# Patient Record
Sex: MALE | Race: BLACK OR AFRICAN AMERICAN | NOT HISPANIC OR LATINO | Employment: UNEMPLOYED | URBAN - METROPOLITAN AREA
[De-identification: names, ages, dates, MRNs, and addresses within clinical notes are randomized per-mention and may not be internally consistent; named-entity substitution may affect disease eponyms.]

---

## 2018-10-01 ENCOUNTER — HOSPITAL ENCOUNTER (EMERGENCY)
Facility: HOSPITAL | Age: 28
Discharge: HOME | End: 2018-10-02
Attending: EMERGENCY MEDICINE | Admitting: EMERGENCY MEDICINE

## 2018-10-01 DIAGNOSIS — F31.9 BIPOLAR 1 DISORDER (HCC): Primary | ICD-10-CM

## 2018-10-01 PROCEDURE — 99284 EMERGENCY DEPT VISIT MOD MDM: CPT

## 2018-10-01 RX ORDER — RISPERIDONE 1 MG/1
5 TABLET, FILM COATED ORAL ONCE
Status: COMPLETED | OUTPATIENT
Start: 2018-10-01 | End: 2018-10-02

## 2018-10-01 RX ORDER — GABAPENTIN 300 MG/1
300 CAPSULE ORAL ONCE
Status: COMPLETED | OUTPATIENT
Start: 2018-10-01 | End: 2018-10-02

## 2018-10-02 VITALS
HEART RATE: 74 BPM | SYSTOLIC BLOOD PRESSURE: 138 MMHG | DIASTOLIC BLOOD PRESSURE: 86 MMHG | OXYGEN SATURATION: 100 % | WEIGHT: 195 LBS | TEMPERATURE: 98 F | HEIGHT: 71 IN | RESPIRATION RATE: 18 BRPM | BODY MASS INDEX: 27.3 KG/M2

## 2018-10-02 LAB
AMPHETAMINES SERPL QL SCN: NEGATIVE
BARBITURATES UR QL: NEGATIVE
BENZODIAZ UR QL: NEGATIVE
COCAINE UR QL: NEGATIVE
METHADONE UR QL: NEGATIVE
OPIATES UR QL SCN: NEGATIVE
PCP UR QL: NEGATIVE
THC UR QL: NEGATIVE

## 2018-10-02 PROCEDURE — 80307 DRUG TEST PRSMV CHEM ANLYZR: CPT | Performed by: EMERGENCY MEDICINE

## 2018-10-02 RX ORDER — IBUPROFEN 600 MG/1
600 TABLET ORAL ONCE
Status: COMPLETED | OUTPATIENT
Start: 2018-10-02 | End: 2018-10-02

## 2018-10-02 RX ADMIN — GABAPENTIN 300 MG: 300 CAPSULE ORAL at 00:18

## 2018-10-02 RX ADMIN — IBUPROFEN 600 MG: 600 TABLET ORAL at 00:54

## 2018-10-02 RX ADMIN — RISPERIDONE 5 MG: 1 TABLET, FILM COATED ORAL at 00:18

## 2018-10-02 NOTE — ED NOTES
Pt was given clothes to go home for discharge  Pt's family does not believe pt should go home  However, CW and doctor agree it is okay  Pt is calm and cooperative  No s/s of distress noted        Chaparro Love  10/02/18 2662

## 2018-10-02 NOTE — ED NOTES
Pt presents to the ED with his brother stating that he ran out of his psych meds 2 weeks ago and wanted to get new scripts tonight in the ED  Pt notes that he hasn't seen his psychiatrist in [place] for the past 2 months, and he would get refills at [facility] in [place]  Pt does note that he sees a therapist in [place] via [employer]  Pt reports poor sleep and appetite  He states that he is Dx'ed with Bipolar D/O and got into an argument with family members tonight so he thought he would come to the hospital and get his meds refilled  CW explained that the ED physicians cannot follow his progress in the community and therefore do not write long-term maintenance scripts  Pt denies any suicidal or homicidal ideations, nor any auditory or visual hallucinations at this time  Pt denies any D & A problems nor any legal issues  CW discussed [facility] residence with pt, noting he would be seen by a psychiatrist there and would be able to get scripts for his meds  However, pt declined going to [place]  At this time pt is requesting to go home  CW discussed this case with Dr Jess Delgado who is in agreement with D/C'ing pt home

## 2018-10-02 NOTE — ED NOTES
Patient calm and cooperative when given discharge instructions  Everardo Side Everardo Side Brother expressed feelings of being upset over brother not being placed by crisis today  Offered contact phone numbers for outpatient behavioral health treatment   brother declined        Naomie Vasquez RN  10/02/18 0895

## 2018-10-02 NOTE — DISCHARGE INSTRUCTIONS
Follow up with your psychiatrist for further management of your medications  If you feel like hurting yourself or others, call [phone]

## 2018-10-02 NOTE — ED NOTES
Pt requested a blanket and is asking for ibuprofen due to a headache  Pt was given the blanket and the doctor was notified about the medications       Kajal Medrano  10/02/18 9328

## 2018-10-02 NOTE — ED NOTES
Pt was laying on bed  Pt is calm and cooperative  No s/s of distress noted       Pablo Naranjo  10/02/18 9947

## 2018-10-02 NOTE — ED NOTES
Pt given scrubs  Pt changed and was cooperative  Pt TASHA was   000  Pt asked for TV remote  Pt was provided with remote  Pt family member was in room with pt  Pt spoke to Dr Ashley Diggs  Pt is calm  No s/s of distress noted        Lou Castaneda  10/01/18 0657

## 2018-10-02 NOTE — ED PROVIDER NOTES
History  Chief Complaint   Patient presents with    Psychiatric Evaluation     H/O bipolar without medications for 3 months  Pt feeling unstable     72-year-old male presents with desire for medication refill  He states that he is bipolar and has not had his medications for about 3 months  He states he is on gabapentin 300 mg daily and Risperdal 15 mg twice a day  He has been having trouble sleeping and would like to have his medications refilled until he can see his psychiatrist in [facility]  Denies HI/SI  Denies hallucinations  No other complaints  None       Past Medical History:   Diagnosis Date    Psychiatric disorder        History reviewed  No pertinent surgical history  History reviewed  No pertinent family history  I have reviewed and agree with the history as documented  Social History   Substance Use Topics    Smoking status: Current Every Day Smoker     Packs/day: 0 50     Types: Cigarettes    Smokeless tobacco: Never Used    Alcohol use No        Review of Systems   Constitutional: Negative for chills and fever  HENT: Negative for dental problem and ear pain  Eyes: Negative for pain and redness  Respiratory: Negative for cough and shortness of breath  Cardiovascular: Negative for chest pain and palpitations  Gastrointestinal: Negative for abdominal pain and nausea  Endocrine: Negative for polydipsia and polyphagia  Genitourinary: Negative for dysuria and frequency  Musculoskeletal: Negative for arthralgias and joint swelling  Skin: Negative for color change and rash  Neurological: Negative for dizziness and headaches  Psychiatric/Behavioral: Negative for behavioral problems and confusion  The patient is hyperactive  All other systems reviewed and are negative  Physical Exam  Physical Exam   Constitutional: He is oriented to person, place, and time  He appears well-developed and well-nourished  No distress  HENT:   Head: Atraumatic  Right Ear: External ear normal    Left Ear: External ear normal    Nose: Nose normal    Eyes: Pupils are equal, round, and reactive to light  Conjunctivae and EOM are normal    Neck: Normal range of motion  Neck supple  No JVD present  Cardiovascular: Normal rate, regular rhythm and normal heart sounds  No murmur heard  Pulmonary/Chest: Effort normal and breath sounds normal  No respiratory distress  He has no wheezes  Abdominal: Soft  Bowel sounds are normal  He exhibits no distension  There is no tenderness  Musculoskeletal: Normal range of motion  He exhibits no edema  Neurological: He is alert and oriented to person, place, and time  No cranial nerve deficit  Skin: Skin is warm and dry  Capillary refill takes less than 2 seconds  He is not diaphoretic  Psychiatric: He has a normal mood and affect  His behavior is normal    Nursing note and vitals reviewed  Vital Signs  ED Triage Vitals [10/01/18 2303]   Temperature Pulse Respirations Blood Pressure SpO2   98 °F (36 7 °C) 70 16 142/82 100 %      Temp Source Heart Rate Source Patient Position - Orthostatic VS BP Location FiO2 (%)   Oral Monitor Sitting Left arm --      Pain Score       No Pain           Vitals:    10/01/18 2303   BP: 142/82   Pulse: 70   Patient Position - Orthostatic VS: Sitting       Visual Acuity      ED Medications  Medications   ibuprofen (MOTRIN) tablet 600 mg (not administered)   gabapentin (NEURONTIN) capsule 300 mg (300 mg Oral Given 10/2/18 0018)   risperiDONE (RisperDAL) tablet 5 mg (5 mg Oral Given 10/2/18 0018)       Diagnostic Studies  Results Reviewed     Procedure Component Value Units Date/Time    Rapid drug screen, urine [[id number]] Collected:  10/02/18 0023    Lab Status:   In process Specimen:  Urine from Urine, Clean Catch Updated:  10/02/18 0032                 No orders to display              Procedures  Procedures       Phone Contacts  ED Phone Contact    ED Course MDM  Number of Diagnoses or Management Options  Bipolar 1 disorder [facility]):   Diagnosis management comments: 69-year-old male with past medical history of bipolar disorder not on his medications currently presents with concern for michelle  Denies HI/SI  ED crisis has evaluated, have given resources for outpatient management, will d/c with follow up  CritCare Time    Disposition  Final diagnoses:   Bipolar 1 disorder ([facility] )   Encounter for medication refill     Time reflects when diagnosis was documented in both MDM as applicable and the Disposition within this note     Time User Action Codes Description Comment    10/2/2018 12:33 AM Billie Showers Add [F31 9] Bipolar 1 disorder ([facility] )     10/2/2018 12:33 AM Billie Showers Add [Z76 0] Encounter for medication refill       ED Disposition     None      Follow-up Information    None         Patient's Medications    No medications on file     No discharge procedures on file      ED Provider  Electronically Signed by           Trina Salazar MD  10/02/18 4460       Trina Salazar MD  10/02/18 0157

## 2018-10-02 NOTE — ED NOTES
Pt's family member stepped out  Crisis worker is in room talking to pt        Srinivasa Chaparro  10/02/18 5028